# Patient Record
Sex: MALE | Race: WHITE | NOT HISPANIC OR LATINO | Employment: OTHER | URBAN - METROPOLITAN AREA
[De-identification: names, ages, dates, MRNs, and addresses within clinical notes are randomized per-mention and may not be internally consistent; named-entity substitution may affect disease eponyms.]

---

## 2024-06-24 ENCOUNTER — OFFICE VISIT (OUTPATIENT)
Dept: URGENT CARE | Facility: CLINIC | Age: 24
End: 2024-06-24
Payer: COMMERCIAL

## 2024-06-24 DIAGNOSIS — S09.92XA NASAL INJURY, INITIAL ENCOUNTER: Primary | ICD-10-CM

## 2024-06-24 PROCEDURE — 99204 OFFICE O/P NEW MOD 45 MIN: CPT | Performed by: PHYSICIAN ASSISTANT

## 2024-06-24 RX ORDER — HYDROCODONE BITARTRATE AND ACETAMINOPHEN 5; 325 MG/1; MG/1
1 TABLET ORAL EVERY 6 HOURS PRN
Qty: 12 TABLET | Refills: 0 | Status: SHIPPED | OUTPATIENT
Start: 2024-06-24 | End: 2024-06-27

## 2024-06-25 ENCOUNTER — HOSPITAL ENCOUNTER (OUTPATIENT)
Dept: RADIOLOGY | Facility: CLINIC | Age: 24
Discharge: HOME | End: 2024-06-25
Payer: COMMERCIAL

## 2024-06-25 DIAGNOSIS — S09.92XA NASAL INJURY, INITIAL ENCOUNTER: ICD-10-CM

## 2024-06-25 PROCEDURE — 70160 X-RAY EXAM OF NASAL BONES: CPT

## 2024-06-25 PROCEDURE — 70160 X-RAY EXAM OF NASAL BONES: CPT | Performed by: RADIOLOGY

## 2024-06-26 VITALS — SYSTOLIC BLOOD PRESSURE: 126 MMHG | DIASTOLIC BLOOD PRESSURE: 78 MMHG | OXYGEN SATURATION: 98 % | HEART RATE: 84 BPM

## 2024-07-08 NOTE — PROGRESS NOTES
Subjective   Patient ID: Kenneth Lara is a 24 y.o. male who presents for nasal injury.    Pt presents today with father c/o nasal pain and bleeding. States that just PTA, he was doing a bench press and the bar fell and hit the bridge of his nose. The right nostril started bleeding and he also has a few cuts on the outside of his nose as well. The nose is very tender. Unsure if it looks uneven - pretty much came straight here. The bleeding from the right nostril has slowed down - has been keeping compression on it. Denies: headache, dizziness, vision changes, LOC, dental pain, broken teeth, lesions/laceration in the mouth.          Review of Systems   All other systems reviewed and are negative.      Objective   /78 Comment: late entry  Pulse 84 Comment: late entry  SpO2 98% Comment: late entry  Height and weight not obtained - pt brought back as a rapid assess due to active bleeding.    Physical Exam  Vitals reviewed.   Constitutional:       General: He is awake.      Appearance: Normal appearance. He is well-developed.   HENT:      Head: Normocephalic and atraumatic.      Nose: Signs of injury and nasal tenderness (over the nasal bridge, primarily on the superior aspect) present.      Right Nostril: Epistaxis present.      Left Nostril: No epistaxis.        Comments: Diffuse swelling over the nasal bridge. Superficial abrasions in circled areas.   Cardiovascular:      Rate and Rhythm: Normal rate.   Pulmonary:      Effort: Pulmonary effort is normal.   Musculoskeletal:      Cervical back: Full passive range of motion without pain.      Right lower leg: No edema.      Left lower leg: No edema.   Skin:     General: Skin is warm and dry.      Findings: No lesion or rash.   Neurological:      General: No focal deficit present.      Mental Status: He is alert and oriented to person, place, and time.      Cranial Nerves: No facial asymmetry.      Motor: Motor function is intact.      Gait: Gait is intact.    Psychiatric:         Attention and Perception: Attention normal.         Mood and Affect: Mood and affect normal.         Assessment/Plan   Diagnoses and all orders for this visit:  Nasal injury, initial encounter  -     HYDROcodone-acetaminophen (Norco) 5-325 mg tablet; Take 1 tablet by mouth every 6 hours if needed for severe pain (7 - 10) for up to 3 days.  -     XR nasal bones; Future    XR ordered - because of time in the evening that pt came in, will likely have to have this done outpatient tomorrow  Advised avoiding blowing the nose and sleeping upright on a recliner to avoid rolling over and putting pressure on the nose while sleeping  Will contact pt tomorrow with results  Based on impact, amount of swelling and tenderness, fracture of the nasal bones is likely though there is no obvious deformity noted. Norco Rx to help with pain control.     Patient education provided to patient and documented in AVS. Red flag symptoms reviewed with patient and all questions answered. Patient or parent/guardian verbalized understanding and agreement with care plan as above. All in office testing reviewed with patient. If symptoms worsen or do not improve, patient is to follow up with PCP or report to the ER.